# Patient Record
Sex: FEMALE | Race: WHITE
[De-identification: names, ages, dates, MRNs, and addresses within clinical notes are randomized per-mention and may not be internally consistent; named-entity substitution may affect disease eponyms.]

---

## 2017-12-04 ENCOUNTER — HOSPITAL ENCOUNTER (EMERGENCY)
Dept: HOSPITAL 19 - COL.ER | Age: 27
Discharge: HOME | End: 2017-12-04
Payer: COMMERCIAL

## 2017-12-04 VITALS — BODY MASS INDEX: 29.92 KG/M2 | WEIGHT: 175.27 LBS | HEIGHT: 64.02 IN

## 2017-12-04 VITALS — HEART RATE: 70 BPM | SYSTOLIC BLOOD PRESSURE: 95 MMHG | DIASTOLIC BLOOD PRESSURE: 55 MMHG

## 2017-12-04 VITALS — TEMPERATURE: 97.1 F

## 2017-12-04 DIAGNOSIS — F41.9: ICD-10-CM

## 2017-12-04 DIAGNOSIS — Z98.818: ICD-10-CM

## 2017-12-04 DIAGNOSIS — Z87.39: ICD-10-CM

## 2017-12-04 DIAGNOSIS — R51: Primary | ICD-10-CM

## 2017-12-04 DIAGNOSIS — F32.9: ICD-10-CM

## 2017-12-04 DIAGNOSIS — G47.419: ICD-10-CM

## 2017-12-04 LAB
ADJUSTED CALCIUM: 9.1 MG/DL (ref 8.4–10.2)
ALBUMIN SERPL-MCNC: 4.7 GM/DL (ref 3.5–5)
ALP SERPL-CCNC: 110 U/L (ref 50–136)
ALT SERPL-CCNC: 24 U/L (ref 9–52)
ANION GAP SERPL CALC-SCNC: 12 MMOL/L (ref 7–16)
BASOPHILS # BLD: 0 10*3/UL (ref 0–0.2)
BASOPHILS NFR BLD AUTO: 0.7 % (ref 0–2)
BILIRUB SERPL-MCNC: 0.7 MG/DL (ref 0–1)
BUN SERPL-MCNC: 14 MG/DL (ref 7–17)
CALCIUM SERPL-MCNC: 9.7 MG/DL (ref 8.4–10.2)
CHLORIDE SERPL-SCNC: 104 MMOL/L (ref 98–107)
CO2 SERPL-SCNC: 23 MMOL/L (ref 22–30)
CREAT SERPL-SCNC: 0.71 MG/DL (ref 0.52–1.25)
EOSINOPHIL # BLD: 0.1 10*3/UL (ref 0–0.7)
EOSINOPHIL NFR BLD: 2.1 % (ref 0–4)
GLUCOSE SERPL-MCNC: 99 MG/DL (ref 74–106)
GRANULOCYTES # BLD AUTO: 61.8 % (ref 42.2–75.2)
HCT VFR BLD AUTO: 41.6 % (ref 37–47)
HGB BLD-MCNC: 14.1 G/DL (ref 12.5–16)
LYMPHOCYTES # BLD: 1.6 10*3/UL (ref 1.2–3.4)
LYMPHOCYTES NFR BLD: 27.9 % (ref 20–51)
MCH RBC QN AUTO: 30 PG (ref 27–31)
MCHC RBC AUTO-ENTMCNC: 34 G/DL (ref 33–37)
MCV RBC AUTO: 88 FL (ref 80–100)
MONOCYTES # BLD: 0.4 10*3/UL (ref 0.1–0.6)
MONOCYTES NFR BLD AUTO: 7.3 % (ref 1.7–9.3)
NEUTROPHILS # BLD: 3.5 10*3/UL (ref 1.4–6.5)
PLATELET # BLD AUTO: 288 K/MM3 (ref 130–400)
PMV BLD AUTO: 11.6 FL (ref 7.4–10.4)
POTASSIUM SERPL-SCNC: 4.1 MMOL/L (ref 3.4–5)
PROT SERPL-MCNC: 8 GM/DL (ref 6.4–8.2)
RBC # BLD AUTO: 4.74 M/MM3 (ref 4.1–5.3)
SODIUM SERPL-SCNC: 138 MMOL/L (ref 137–145)
WBC # BLD AUTO: 5.7 K/MM3 (ref 4.8–10.8)

## 2017-12-05 ENCOUNTER — HOSPITAL ENCOUNTER (EMERGENCY)
Dept: HOSPITAL 19 - COL.ER | Age: 27
Discharge: HOME | End: 2017-12-05
Payer: COMMERCIAL

## 2017-12-05 VITALS — HEIGHT: 64.02 IN | WEIGHT: 175.27 LBS | BODY MASS INDEX: 29.92 KG/M2

## 2017-12-05 VITALS — TEMPERATURE: 98.1 F | SYSTOLIC BLOOD PRESSURE: 127 MMHG | DIASTOLIC BLOOD PRESSURE: 74 MMHG

## 2017-12-05 VITALS — HEART RATE: 74 BPM

## 2017-12-05 DIAGNOSIS — R51: Primary | ICD-10-CM

## 2017-12-05 LAB
APPEARANCE CSF: CLEAR
APPEARANCE CSF: CLEAR
CEREBROSPINAL: (no result)
COLOR CSF: COLORLESS
COLOR CSF: COLORLESS
GRANULOCYTES NFR CSF MANUAL: 0 % (ref 0–6)
GRANULOCYTES NFR CSF MANUAL: 0 % (ref 0–6)

## 2018-09-17 ENCOUNTER — HOSPITAL ENCOUNTER (OUTPATIENT)
Dept: HOSPITAL 19 - COL.RAD | Age: 28
End: 2018-09-17
Attending: FAMILY MEDICINE
Payer: COMMERCIAL

## 2018-09-17 DIAGNOSIS — R10.11: Primary | ICD-10-CM

## 2019-01-11 ENCOUNTER — HOSPITAL ENCOUNTER (OUTPATIENT)
Dept: HOSPITAL 19 - COL.VAS | Age: 29
End: 2019-01-11
Attending: FAMILY MEDICINE
Payer: COMMERCIAL

## 2019-01-11 DIAGNOSIS — M79.605: Primary | ICD-10-CM

## 2019-01-11 DIAGNOSIS — L73.2: ICD-10-CM

## 2019-08-11 ENCOUNTER — HOSPITAL ENCOUNTER (EMERGENCY)
Facility: CLINIC | Age: 29
Discharge: HOME | End: 2019-08-11
Attending: INTERNAL MEDICINE | Admitting: INTERNAL MEDICINE

## 2019-08-11 VITALS
BODY MASS INDEX: 33.29 KG/M2 | TEMPERATURE: 98.1 F | DIASTOLIC BLOOD PRESSURE: 88 MMHG | WEIGHT: 195 LBS | OXYGEN SATURATION: 99 % | RESPIRATION RATE: 16 BRPM | SYSTOLIC BLOOD PRESSURE: 132 MMHG | HEIGHT: 64 IN

## 2019-08-11 DIAGNOSIS — T78.40XA ALLERGIC REACTION, INITIAL ENCOUNTER: ICD-10-CM

## 2019-08-11 PROCEDURE — 96374 THER/PROPH/DIAG INJ IV PUSH: CPT | Performed by: INTERNAL MEDICINE

## 2019-08-11 PROCEDURE — 99284 EMERGENCY DEPT VISIT MOD MDM: CPT | Mod: 25 | Performed by: INTERNAL MEDICINE

## 2019-08-11 PROCEDURE — 99284 EMERGENCY DEPT VISIT MOD MDM: CPT | Mod: Z6 | Performed by: INTERNAL MEDICINE

## 2019-08-11 PROCEDURE — 25000128 H RX IP 250 OP 636: Performed by: INTERNAL MEDICINE

## 2019-08-11 RX ORDER — ARMODAFINIL 200 MG/1
200 TABLET ORAL EVERY MORNING
COMMUNITY

## 2019-08-11 RX ORDER — METHYLPREDNISOLONE SODIUM SUCCINATE 125 MG/2ML
125 INJECTION, POWDER, LYOPHILIZED, FOR SOLUTION INTRAMUSCULAR; INTRAVENOUS ONCE
Status: COMPLETED | OUTPATIENT
Start: 2019-08-11 | End: 2019-08-11

## 2019-08-11 RX ORDER — PREDNISONE 20 MG/1
40 TABLET ORAL DAILY
Qty: 10 TABLET | Refills: 0 | Status: SHIPPED | OUTPATIENT
Start: 2019-08-11 | End: 2019-08-16

## 2019-08-11 RX ORDER — HYDROXYZINE PAMOATE 50 MG/1
50 CAPSULE ORAL 3 TIMES DAILY PRN
Qty: 30 CAPSULE | Refills: 0 | Status: SHIPPED | OUTPATIENT
Start: 2019-08-11

## 2019-08-11 RX ORDER — DIPHENHYDRAMINE HCL 25 MG
25 CAPSULE ORAL EVERY 6 HOURS PRN
COMMUNITY

## 2019-08-11 RX ORDER — DULOXETIN HYDROCHLORIDE 60 MG/1
60 CAPSULE, DELAYED RELEASE ORAL DAILY
COMMUNITY

## 2019-08-11 RX ORDER — FEXOFENADINE HCL 180 MG/1
180 TABLET ORAL DAILY
COMMUNITY

## 2019-08-11 RX ADMIN — METHYLPREDNISOLONE SODIUM SUCCINATE 125 MG: 125 INJECTION, POWDER, FOR SOLUTION INTRAMUSCULAR; INTRAVENOUS at 14:57

## 2019-08-11 ASSESSMENT — MIFFLIN-ST. JEOR: SCORE: 1609.51

## 2019-08-11 NOTE — ED PROVIDER NOTES
[place] EMERGENCY DEPARTMENT ([facility])  August 11, 2019    History     Chief Complaint   Patient presents with     Allergic Reaction     Hives     Shortness of Breath     HPI  Hilary Ramirez is a 28 year old female with history notable for fibromyalgia presents the ED with allergic reaction.  Patient states that she just received a Humira shot, 2 weeks ago and developed diffuse hives all over her body, swelling her hands, and feels a foreign body sensation in her throat.  She states she did take Allegra and Benadryl prior to arrival this morning, but has not had improvement of her symptoms.  She did not contact the doctor who prescribed her the Humira and advised her  to stop taking the Humira.      PAST MEDICAL HISTORY  Past Medical History:   Diagnosis Date     Fibromyalgia      Major depressive disorder      Narcolepsy without cataplexy      OCD (obsessive compulsive disorder)      PAST SURGICAL HISTORY  Past Surgical History:   Procedure Laterality Date     ENT SURGERY       FAMILY HISTORY  History reviewed. No pertinent family history.  SOCIAL HISTORY  Social History     Tobacco Use     Smoking status: Never Smoker     Smokeless tobacco: Never Used     Tobacco comment: Smokes CBD oil   Substance Use Topics     Alcohol use: Not Currently     MEDICATIONS  No current facility-administered medications for this encounter.      Current Outpatient Medications   Medication     Armodafinil 200 MG TABS     diphenhydrAMINE (BENADRYL) 25 MG capsule     DULoxetine (CYMBALTA) 60 MG capsule     fexofenadine (ALLEGRA) 180 MG tablet     hydrOXYzine (VISTARIL) 50 MG capsule     Levonorgestrel (KYLEENA IU)     predniSONE (DELTASONE) 20 MG tablet     ranitidine (ZANTAC) 150 MG tablet     ALLERGIES  Allergies   Allergen Reactions     Avocado Swelling     Humira Hives       I have reviewed the Medications, Allergies, Past Medical and Surgical History, and Social History in the Epic system.    Review of Systems  "  Skin: Positive for rash (diffuse urticaria).   All other systems reviewed and are negative.      Physical Exam   BP: (!) 144/103  Heart Rate: 96  Temp: 98.1  F (36.7  C)  Resp: 12  Height: 162.6 cm (5' 4\")  Weight: 88.5 kg (195 lb)  SpO2: 98 %      Physical Exam   Constitutional: No distress.   HENT:   Head: Atraumatic.   Mouth/Throat: Oropharynx is clear and moist. No oropharyngeal exudate.   Eyes: Pupils are equal, round, and reactive to light. No scleral icterus.   Neck: Neck supple. No JVD present.   Cardiovascular: Normal rate, regular rhythm, normal heart sounds and intact distal pulses. Exam reveals no gallop and no friction rub.   No murmur heard.  Pulmonary/Chest: Effort normal and breath sounds normal. No stridor. No respiratory distress. She has no wheezes. She has no rales. She exhibits no tenderness.   Abdominal: Soft. Bowel sounds are normal. She exhibits no distension and no mass. There is no tenderness. There is no rebound and no guarding. No hernia.   Musculoskeletal: She exhibits no edema or tenderness.   Neurological: She is alert. No cranial nerve deficit. Coordination normal.   Skin: Skin is warm. Rash (urticarial hands and forearm and legs) noted. She is not diaphoretic.       ED Course        Procedures   2:37 PM  The patient was seen and examined by Dr. Castillo in Room 31.                Labs Ordered and Resulted from Time of ED Arrival Up to the Time of Departure from the ED - No data to display         Assessments & Plan (with Medical Decision Making)  Allergic reaction to humera that has been given for fibromyalgia last dose two weeks ago, given solumedrol 125 iv then discharge with prednison, vistaril, zantac prn, stop humera, follow up with her PMD. No respiratory compromise.       I have reviewed the nursing notes.    I have reviewed the findings, diagnosis, plan and need for follow up with the patient.    Discharge Medication List as of 8/11/2019  3:13 PM      START taking these " medications    Details   hydrOXYzine (VISTARIL) 50 MG capsule Take 1 capsule (50 mg) by mouth 3 times daily as needed for itching, Disp-30 capsule, R-0, Local Print      predniSONE (DELTASONE) 20 MG tablet Take 2 tablets (40 mg) by mouth daily for 5 days, Disp-10 tablet, R-0, Local Print             Final diagnoses:   Allergic reaction, initial encounter     IBi, am serving as a trained medical scribe to document services personally performed by Praveen Castillo MD, based on the provider's statements to me.      IPraveen MD, was physically present and have reviewed and verified the accuracy of this note documented by Bi Ellsworth.     8/11/2019   [facility], [place], EMERGENCY DEPARTMENT     Praveen Castillo MD  08/11/19 [id number]

## 2019-08-11 NOTE — DISCHARGE INSTRUCTIONS
Please make an appointment to follow up with Your Primary Care Provider in 5-10 days even if entirely better.

## 2019-08-11 NOTE — ED AVS SNAPSHOT
[facility], [place], Emergency Department  [address] [place] [id number]  Phone:  [phone]                                    Hilary Ramirez   MRN: [id number]    Department:  [facility], Emergency Department   Date of Visit:  8/11/2019           After Visit Summary Signature Page    I have received my discharge instructions, and my questions have been answered. I have discussed any challenges I see with this plan with the nurse or doctor.    ..........................................................................................................................................  Patient/Patient Representative Signature      ..........................................................................................................................................  Patient Representative Print Name and Relationship to Patient    ..................................................               ................................................  Date                                   Time    ..........................................................................................................................................  Reviewed by Signature/Title    ...................................................              ..............................................  Date                                               Time          22EPIC Rev 08/18

## 2019-08-11 NOTE — ED TRIAGE NOTES
"Hilary comes to the ED today for evaluation of possible allergic reaction.  Received Humira 2 weeks ago for fibromyalgia.  Experienced what she describes as an \"allergic reaction.  All over hives, lightheadedness, fatigue, diarrhea, and abdominal pain, tightness in throat\".  Today she is experiencing similar symptoms despite taking home medications.  She appears in no acute distress.  "